# Patient Record
Sex: FEMALE | URBAN - METROPOLITAN AREA
[De-identification: names, ages, dates, MRNs, and addresses within clinical notes are randomized per-mention and may not be internally consistent; named-entity substitution may affect disease eponyms.]

---

## 2020-11-17 ENCOUNTER — TELEPHONE ENCOUNTER (OUTPATIENT)
Dept: URBAN - METROPOLITAN AREA CLINIC 35 | Facility: CLINIC | Age: 75
End: 2020-11-17

## 2021-06-15 ENCOUNTER — TELEPHONE ENCOUNTER (OUTPATIENT)
Dept: URBAN - METROPOLITAN AREA CLINIC 35 | Facility: CLINIC | Age: 76
End: 2021-06-15

## 2023-12-14 ENCOUNTER — CLAIMS CREATED FROM THE CLAIM WINDOW (OUTPATIENT)
Dept: URBAN - METROPOLITAN AREA MEDICAL CENTER 10 | Facility: MEDICAL CENTER | Age: 78
End: 2023-12-14
Payer: MEDICARE

## 2023-12-14 DIAGNOSIS — K62.5 ANAL BLEEDING: ICD-10-CM

## 2023-12-14 DIAGNOSIS — R10.84 ABDOMINAL CRAMPING, GENERALIZED: ICD-10-CM

## 2023-12-14 DIAGNOSIS — K59.09 CHANGE IN BOWEL MOVEMENTS INTERMITTENT CONSTIPATION. URGENCY IN THE MORNING.: ICD-10-CM

## 2023-12-14 PROCEDURE — 99223 1ST HOSP IP/OBS HIGH 75: CPT | Performed by: INTERNAL MEDICINE

## 2023-12-14 PROCEDURE — 99255 IP/OBS CONSLTJ NEW/EST HI 80: CPT | Performed by: INTERNAL MEDICINE

## 2023-12-14 PROCEDURE — G8427 DOCREV CUR MEDS BY ELIG CLIN: HCPCS | Performed by: INTERNAL MEDICINE

## 2023-12-16 ENCOUNTER — CLAIMS CREATED FROM THE CLAIM WINDOW (OUTPATIENT)
Dept: URBAN - METROPOLITAN AREA MEDICAL CENTER 10 | Facility: MEDICAL CENTER | Age: 78
End: 2023-12-16
Payer: MEDICARE

## 2023-12-16 DIAGNOSIS — K31.89 ACHYLIA: ICD-10-CM

## 2023-12-16 DIAGNOSIS — K92.2 ACUTE GASTROINTESTINAL BLEEDING: ICD-10-CM

## 2023-12-16 PROCEDURE — 43235 EGD DIAGNOSTIC BRUSH WASH: CPT | Performed by: INTERNAL MEDICINE

## 2023-12-17 ENCOUNTER — CLAIMS CREATED FROM THE CLAIM WINDOW (OUTPATIENT)
Dept: URBAN - METROPOLITAN AREA MEDICAL CENTER 10 | Facility: MEDICAL CENTER | Age: 78
End: 2023-12-17
Payer: MEDICARE

## 2023-12-17 ENCOUNTER — CLAIMS CREATED FROM THE CLAIM WINDOW (OUTPATIENT)
Dept: URBAN - METROPOLITAN AREA MEDICAL CENTER 10 | Facility: MEDICAL CENTER | Age: 78
End: 2023-12-17

## 2023-12-17 DIAGNOSIS — K62.5 ANAL BLEEDING: ICD-10-CM

## 2023-12-17 DIAGNOSIS — K59.09 CHANGE IN BOWEL MOVEMENTS INTERMITTENT CONSTIPATION. URGENCY IN THE MORNING.: ICD-10-CM

## 2023-12-17 PROCEDURE — 99232 SBSQ HOSP IP/OBS MODERATE 35: CPT | Performed by: INTERNAL MEDICINE

## 2023-12-18 ENCOUNTER — CLAIMS CREATED FROM THE CLAIM WINDOW (OUTPATIENT)
Dept: URBAN - METROPOLITAN AREA MEDICAL CENTER 10 | Facility: MEDICAL CENTER | Age: 78
End: 2023-12-18

## 2023-12-18 ENCOUNTER — CLAIMS CREATED FROM THE CLAIM WINDOW (OUTPATIENT)
Dept: URBAN - METROPOLITAN AREA MEDICAL CENTER 10 | Facility: MEDICAL CENTER | Age: 78
End: 2023-12-18
Payer: MEDICARE

## 2023-12-18 DIAGNOSIS — K92.1 ACUTE MELENA: ICD-10-CM

## 2023-12-18 DIAGNOSIS — D50.0 1. ANEMIA, IRON DEFICIENCY FROM CHRONIC BLOOD LOSS:: ICD-10-CM

## 2023-12-18 PROCEDURE — 45380 COLONOSCOPY AND BIOPSY: CPT | Performed by: INTERNAL MEDICINE

## 2023-12-18 PROCEDURE — 45378 DIAGNOSTIC COLONOSCOPY: CPT | Performed by: INTERNAL MEDICINE

## 2023-12-19 ENCOUNTER — CLAIMS CREATED FROM THE CLAIM WINDOW (OUTPATIENT)
Dept: URBAN - METROPOLITAN AREA MEDICAL CENTER 10 | Facility: MEDICAL CENTER | Age: 78
End: 2023-12-19

## 2023-12-19 ENCOUNTER — CLAIMS CREATED FROM THE CLAIM WINDOW (OUTPATIENT)
Dept: URBAN - METROPOLITAN AREA MEDICAL CENTER 10 | Facility: MEDICAL CENTER | Age: 78
End: 2023-12-19
Payer: MEDICARE

## 2023-12-19 DIAGNOSIS — K59.09 CHANGE IN BOWEL MOVEMENTS INTERMITTENT CONSTIPATION. URGENCY IN THE MORNING.: ICD-10-CM

## 2023-12-19 DIAGNOSIS — K62.5 ANAL BLEEDING: ICD-10-CM

## 2023-12-19 PROCEDURE — 99231 SBSQ HOSP IP/OBS SF/LOW 25: CPT | Performed by: PHYSICIAN ASSISTANT

## 2023-12-19 PROCEDURE — 99231 SBSQ HOSP IP/OBS SF/LOW 25: CPT | Performed by: INTERNAL MEDICINE

## 2023-12-30 ENCOUNTER — CLAIMS CREATED FROM THE CLAIM WINDOW (OUTPATIENT)
Dept: URBAN - METROPOLITAN AREA MEDICAL CENTER 10 | Facility: MEDICAL CENTER | Age: 78
End: 2023-12-30
Payer: MEDICARE

## 2023-12-30 DIAGNOSIS — R55 ATYPICAL SYNCOPE: ICD-10-CM

## 2023-12-30 DIAGNOSIS — D50.9 ANEMIA: ICD-10-CM

## 2023-12-30 PROCEDURE — 99222 1ST HOSP IP/OBS MODERATE 55: CPT | Performed by: STUDENT IN AN ORGANIZED HEALTH CARE EDUCATION/TRAINING PROGRAM

## 2023-12-30 PROCEDURE — 99254 IP/OBS CNSLTJ NEW/EST MOD 60: CPT | Performed by: STUDENT IN AN ORGANIZED HEALTH CARE EDUCATION/TRAINING PROGRAM

## 2023-12-31 ENCOUNTER — CLAIMS CREATED FROM THE CLAIM WINDOW (OUTPATIENT)
Dept: URBAN - METROPOLITAN AREA MEDICAL CENTER 10 | Facility: MEDICAL CENTER | Age: 78
End: 2023-12-31
Payer: MEDICARE

## 2023-12-31 DIAGNOSIS — D50.9 ANEMIA: ICD-10-CM

## 2023-12-31 PROCEDURE — 99232 SBSQ HOSP IP/OBS MODERATE 35: CPT | Performed by: STUDENT IN AN ORGANIZED HEALTH CARE EDUCATION/TRAINING PROGRAM

## 2024-02-05 ENCOUNTER — OV NP (OUTPATIENT)
Dept: URBAN - METROPOLITAN AREA CLINIC 35 | Facility: CLINIC | Age: 79
End: 2024-02-05
Payer: MEDICARE

## 2024-02-05 VITALS
DIASTOLIC BLOOD PRESSURE: 82 MMHG | BODY MASS INDEX: 28.89 KG/M2 | HEIGHT: 62 IN | SYSTOLIC BLOOD PRESSURE: 124 MMHG | WEIGHT: 157 LBS

## 2024-02-05 DIAGNOSIS — K57.90 DIVERTICULOSIS: ICD-10-CM

## 2024-02-05 DIAGNOSIS — M48.00 SPINAL STENOSIS, UNSPECIFIED SPINAL REGION: ICD-10-CM

## 2024-02-05 DIAGNOSIS — D50.0 IRON DEFICIENCY ANEMIA DUE TO CHRONIC BLOOD LOSS: ICD-10-CM

## 2024-02-05 DIAGNOSIS — K44.9 HIATAL HERNIA: ICD-10-CM

## 2024-02-05 DIAGNOSIS — M54.9 DORSALGIA: ICD-10-CM

## 2024-02-05 DIAGNOSIS — K29.30 CHRONIC SUPERFICIAL GASTRITIS WITHOUT BLEEDING: ICD-10-CM

## 2024-02-05 PROBLEM — 397881000: Status: ACTIVE | Noted: 2024-02-05

## 2024-02-05 PROBLEM — 196735001: Status: ACTIVE | Noted: 2024-02-05

## 2024-02-05 PROBLEM — 724556004: Status: ACTIVE | Noted: 2024-02-05

## 2024-02-05 PROCEDURE — 99204 OFFICE O/P NEW MOD 45 MIN: CPT | Performed by: PHYSICIAN ASSISTANT

## 2024-02-05 RX ORDER — LISINOPRIL 10 MG/1
1 TABLET TABLET ORAL ONCE A DAY
Status: ACTIVE | COMMUNITY

## 2024-02-05 RX ORDER — HYDROCODONE BITARTRATE AND ACETAMINOPHEN 5; 325 MG/1; MG/1
1 TABLET AS NEEDED TABLET ORAL
Status: ACTIVE | COMMUNITY

## 2024-02-05 RX ORDER — ACETAMINOPHEN 650 MG
2 TABLETS AS NEEDED TABLET, EXTENDED RELEASE ORAL
Status: ACTIVE | COMMUNITY

## 2024-02-05 RX ORDER — OMEPRAZOLE 20 MG/1
1 CAPSULE 30 MINUTES BEFORE MORNING MEAL CAPSULE, DELAYED RELEASE ORAL ONCE A DAY
Status: ACTIVE | COMMUNITY

## 2024-02-05 RX ORDER — METOPROLOL SUCCINATE 25 MG/1
1 TABLET TABLET, FILM COATED, EXTENDED RELEASE ORAL ONCE A DAY
Status: ACTIVE | COMMUNITY

## 2024-02-05 RX ORDER — CABERGOLINE 0.5 MG/1
1 TABLET TABLET ORAL
Status: ACTIVE | COMMUNITY

## 2024-02-05 NOTE — HPI-HOSPITAL FOLLOWUP
78 year old female patient presented to [facility] on (12/14/2023) for blood in her stool which had been present for 2 days prior. She was admitted for a GI bleed. She was also having diarrhea at the time. She admits she has varying bowel habits. She will have constipation and then a bout of diarrhea. At the hospital she was given Linzess which she has not been taking. Patient was found to have had acute lower GI bleed which was secondary to diverticular bleeding with element of possible hemorrhoidal bleeding. FOB postive. She was in the hospital for 6 days and did not stop bleeding until the day before she was discharged. She admits she was taking 1000 mg of cierra aspirin as well as her hydrocodone-acetaminophen. She admits 10 days after the hospital she passed out at a restaurant and her hemoglobin was very low. At this hospital stay she was given two rounds of blood.  Pt had been taking ASA for spinal stenosis, but has been avoiding it now.  Most recent hgb check outpatient was 12.1.  She is on oral iron now.  HPI 12/14/23  79 yo female with history of, spinal stenosis,back surgeries and medial branch block,spinal chord stimulator Prolactinoma/pituitary adenoma since 2019 and diverticulosis by CT -history of constipation and rectal stool ball this yearAlso similar problems in 2015 but has never had colonscopy and never with blood before . She has known hemorrhoids from pregnancy. This episode onset Tuesday afternoon to evening with constipation for several days while moving and had not kept up with laxatives--prune juice works for her-She then had episode of cramping with sweating and weakness followed eventually with large brown stool and then several more stools now with blood -BR and dark red--Sh e continued over 2 days to have approx 10 stools with blood -She did feel like stool was black at times and denied any NSAIDS or Pepto--takes 1000mg ASA q AM for chronic back pain HGB dropped from previous 12.5-in February 2022;9.2 today  HPI 12/30/23 Dr. Lexa Orta is a 78 y.o. female history of chronic pain, recent admission with acute GI bleeding status post EGD and colonoscopy, CTA.  Working differential was likely acute diverticular hemorrhage.  Discharge hemoglobin was 7.5.  Patient presents with syncope episode at a restaurant.  She reports day of incident took Linzess in AM (recently Rx) and felt odd/lightheaded during day, later in evening went to dinner, had syncopal episode. She denies any overt bleeding since discharge, no melena, no bright red blood. She does endorse taking motrin but not ASA since last admission. An occult was checked in ED negative. She of note reports hx syncope/presyncope with prior constipation, prior lumbar injection.  XR Chest shows: No airspace consolidation.  CT Angio Abd and Pelvis shows: 1.  No evidence of active bleeding in the gastrointestinal tract. 2.  Colonic diverticulosis without evidence of acute diverticulitis. 3.  Additional chronic/incidental findings as above.  12/16/23 EGD Dr. Brown   - Z-line regular, 36 cm from the incisors.   - 2 cm hiatal hernia.                       - Normal esophagus.                       - Erythematous mucosa in the prepyloric region of the                        stomach.                       - Normal examined duodenum.                       - No specimens collected.  12/18/23 Colonoscopy Dr. Montoya    - Non-thrombosed external hemorrhoids found on                        perianal exam.                       - Diverticulosis in the entire examined colon. There                        was no evidence of diverticular bleeding.                       - No specimens collected.